# Patient Record
Sex: FEMALE | Race: WHITE
[De-identification: names, ages, dates, MRNs, and addresses within clinical notes are randomized per-mention and may not be internally consistent; named-entity substitution may affect disease eponyms.]

---

## 2019-12-11 ENCOUNTER — HOSPITAL ENCOUNTER (OUTPATIENT)
Dept: HOSPITAL 61 - ECHO | Age: 59
Discharge: HOME | End: 2019-12-11
Attending: INTERNAL MEDICINE
Payer: MEDICAID

## 2019-12-11 DIAGNOSIS — I08.8: Primary | ICD-10-CM

## 2019-12-11 PROCEDURE — 93306 TTE W/DOPPLER COMPLETE: CPT

## 2019-12-11 NOTE — CARD
MR#: [id number]

Account#: [id number]

Accession#: [id number]

Date of Study: 12/11/2019

Ordering Physician: AKIN CASTELLANO, 

Referring Physician: AKIN CASTELLANO 

Tech: Indigo Begum RDCS





--------------- APPROVED REPORT --------------





EXAM: Two-dimensional and M-mode echocardiogram with Doppler and color Doppler.



Other Information 

Quality : Good



INDICATION

Aortic Valve Disease



2D DIMENSIONS 

RVDd2.2 (2.9-3.5cm)Left Atrium(2D)2.7 (1.6-4.0cm)

IVSd0.8 (0.7-1.1cm)Aortic Root(2D)2.7 (2.0-3.7cm)

LVDd4.2 (3.9-5.9cm)LVOT Diameter1.9 (1.8-2.4cm)

PWd0.7 (0.7-1.1cm)LVDs2.8 (2.5-4.0cm)

FS (%) 34.4 %SV51.4 ml

LVEF(%)60.0 (>50%)



Aortic Valve

AoV Peak Jasmeet.241.6cm/sAoV VTI43.7cm

AO Peak GR.23.4mmHgLVOT Peak Jasmeet.89.4cm/s

AO Mean GR.13mmHgAVA (VMAX)1.10cm2

SYLVESTER   (VTI)1.10cm2



Mitral Valve

MV E Tdpylhaj19.0cm/sMV DECEL [id number]

MV A Gykdrrbm93.9cm/sE/A  Ratio0.7



Tricuspid Valve

TR P. Noutntew235pr/sRAP STNMROSO3ogWa

TR Peak Gr.53cgMtGZSO63jwQx



Pulmonary Vein

S1 Fnzbhvkf56.0cm/sD2 Riasipmn57.2cm/s



 LEFT VENTRICLE 

The left ventricle is normal size. There is normal left ventricular wall thickness. The left ventricu
lar systolic function is normal and the ejection fraction is within normal range. The Ejection Fracti
on is 55-60%. There is normal LV segmental wall motion. Transmitral Doppler flow pattern is Grade I-a
bnormal relaxation pattern.



 RIGHT VENTRICLE 

The right ventricle is normal size. The right ventricular systolic function is normal.



 ATRIA 

The left atrium size is normal. The right atrium size is normal. The interatrial septum is intact wit
h no evidence for an atrial septal defect or patent foramen ovale as noted on 2-D or Doppler imaging.




 AORTIC VALVE 

The aortic valve is functionally bicuspid with fusion of the right and left cusps. Doppler and Color 
Flow revealed trace aortic regurgitation. Calculated aortic valve area is 1.1 cm2 with maximum pressu
re gradient of 24 mmHg and mean pressure gradient of 13 mmHg. Doppler and color-flow analysis reveale
d mild aortic stenosis. Planimeter of the aortic valve shows an area of 1.7 cm2. 



 MITRAL VALVE 

The mitral valve is calcified but opens well. Mitral annular calcification is mild. There is no evide
nce of mitral valve prolapse. There is no mitral valve stenosis. Doppler and Color Flow revealed no m
itral valve regurgitation noted.



 TRICUSPID VALVE 

The tricuspid valve is normal in structure and function. Doppler and Color Flow revealed physiologica
l tricuspid regurgitation. The PA pressure was estimated at 19 mmHg. There is no tricuspid valve sten
osis.



 PULMONIC VALVE 

Doppler and Color Flow revealed trace to mild pulmonic valvular regurgitation. There is no pulmonic v
alvular stenosis.



 GREAT VESSELS 

The aortic root is normal in size. The ascending aorta is normal in size. The IVC is normal in size a
nd collapses >50% with inspiration.



 PERICARDIAL EFFUSION 

There is no evidence of significant pericardial effusion.



Critical Notification

Critical Value: No



<Conclusion>

The left ventricular systolic function is normal and the ejection fraction is within normal range. Th
e Ejection Fraction is 55-60%.

There is normal LV segmental wall motion.

The aortic valve is functionally bicuspid with fusion of the right and left cusps.

Calculated aortic valve area is 1.1 cm2 with maximum pressure gradient of 24 mmHg and mean pressure g
radient of 13 mmHg. Doppler and color-flow analysis revealed mild aortic stenosis. Planimeter of the 
aortic valve shows an area of 1.7 cm2.



Signed by : Chandler Iqbal, 

Electronically Approved : 12/11/2019 11:15:11

## 2020-12-16 ENCOUNTER — HOSPITAL ENCOUNTER (OUTPATIENT)
Dept: HOSPITAL 61 - ECHO | Age: 60
End: 2020-12-16
Attending: INTERNAL MEDICINE
Payer: MEDICAID

## 2020-12-16 DIAGNOSIS — I08.3: Primary | ICD-10-CM

## 2020-12-16 PROCEDURE — 93306 TTE W/DOPPLER COMPLETE: CPT

## 2020-12-18 NOTE — CARD
MR#: [id number]

Account#: [id number]

Accession#: [id number]

Date of Study: 12/16/2020

Ordering Physician: AKIN CASTELLANO, 

Referring Physician: AKIN CASTELLANO 

Tech: Rosario Garcia REBECCA





--------------- APPROVED REPORT --------------





EXAM: Two-dimensional and M-mode echocardiogram with Doppler and color Doppler.



Other Information 

Quality : AverageLimitedTechnically LimitedExcellentGoodAverageHR: 73bpm

Rhythm : NSR



INDICATION

Aortic Valve Disease

Murmur 



RISK FACTORS

Hyperlipidemia



2D DIMENSIONS 

RVDd3.2 (2.9-3.5cm)Left Atrium(2D)2.9 (1.6-4.0cm)

IVSd0.8 (0.7-1.1cm)Aortic Root(2D)2.8 (2.0-3.7cm)

LVDd3.6 (3.9-5.9cm)LVOT Diameter1.9 (1.8-2.4cm)

PWd0.8 (0.7-1.1cm)LVDs2.2 (2.5-4.0cm)

FS (%) 37.6 %SV37.4 ml



Aortic Valve

AoV Peak Jasmeet.229.3cm/sAoV VTI50.2cm

AO Peak GR.21.0mmHgLVOT Peak Jasmeet.83.5cm/s

AO Mean GR.11mmHgAVA (VMAX)0.99cm2



Mitral Valve

MV E Rsnerzeg54.4cm/sMV DECEL [id number]

MV A Ccyceyea79.7cm/sE/A  Ratio1.0

MV A Bikbdhgm184js



Pulmonary Valve

PV Peak Gesromsr96.5cm/s



Tricuspid Valve

TR P. Yrfhcpry602ne/sTR Peak Gr.20mmHg



Pulmonary Vein

S1 Spryaoof07.7cm/sD2 Xizdqpsg02.9cm/s

PVa ysolsnht812mdev



 LEFT VENTRICLE 

The left ventricle is normal size. There is normal left ventricular wall thickness. The left ventricu
lar systolic function is normal and the ejection fraction is within normal range. Ejection fraction 5
5-60%. There is normal LV segmental wall motion. Tissue Doppler imaging reveals mild abnormal left ve
ntricular diastolic dysfunction. No left ventricle thrombus noted on this study.



 RIGHT VENTRICLE 

The right ventricle is normal size. There is normal right ventricular wall thickness. The right ventr
icular systolic function is normal.



 ATRIA 

The left atrium size is normal. The right atrium size is normal. The interatrial septum is intact wit
h no evidence for an atrial septal defect or patent foramen ovale as noted on 2-D or Doppler imaging.




 AORTIC VALVE 

The aortic valve is mildly calcified. Doppler and Color Flow revealed no significant aortic regurgita
tion. There is mild valvular aortic stenosis.



 MITRAL VALVE 

The mitral valve is normal in structure and function. There is no evidence of mitral valve prolapse. 
There is no mitral valve stenosis. Doppler and Color-flow revealed mild mitral regurgitation.



 TRICUSPID VALVE 

The tricuspid valve is normal in structure and function. Doppler and Color Flow revealed mild tricusp
id regurgitation.



 PULMONIC VALVE 

The pulmonary valve is normal in structure and function. Doppler and Color Flow revealed no pulmonic 
valvular regurgitation.



 GREAT VESSELS 

The aortic root is normal in size. The descending aorta is upper limits of normal. The IVC is normal 
in size and collapses >50% with inspiration.



 PERICARDIAL EFFUSION 

There is no evidence of significant pericardial effusion.



Critical Notification

Critical Value: No



<Conclusion>

The left ventricle is normal size.

The left ventricular systolic function is normal and the ejection fraction is within normal range. 

Ejection fraction 55-60%.

There is normal LV segmental wall motion.

The aortic valve is mildly calcified.

Doppler and Color Flow revealed no significant aortic regurgitation.

There is mild valvular aortic stenosis.

Doppler and Color-flow revealed mild mitral regurgitation.

Doppler and Color Flow revealed mild tricuspid regurgitation.



Signed by : Skinny Ramirez MD

Electronically Approved : 12/18/2020 11:40:55

## 2020-12-31 ENCOUNTER — HOSPITAL ENCOUNTER (OUTPATIENT)
Dept: HOSPITAL 61 - LAB | Age: 60
End: 2020-12-31
Attending: INTERNAL MEDICINE
Payer: MEDICAID

## 2020-12-31 DIAGNOSIS — I63.9: ICD-10-CM

## 2020-12-31 DIAGNOSIS — Z01.812: Primary | ICD-10-CM

## 2020-12-31 DIAGNOSIS — Z20.828: ICD-10-CM

## 2020-12-31 DIAGNOSIS — I35.0: ICD-10-CM

## 2020-12-31 PROCEDURE — U0003 INFECTIOUS AGENT DETECTION BY NUCLEIC ACID (DNA OR RNA); SEVERE ACUTE RESPIRATORY SYNDROME CORONAVIRUS 2 (SARS-COV-2) (CORONAVIRUS DISEASE [COVID-19]), AMPLIFIED PROBE TECHNIQUE, MAKING USE OF HIGH THROUGHPUT TECHNOLOGIES AS DESCRIBED BY CMS-2020-01-R: HCPCS

## 2021-01-04 ENCOUNTER — HOSPITAL ENCOUNTER (OUTPATIENT)
Dept: HOSPITAL 61 - CCL | Age: 61
Discharge: HOME | End: 2021-01-04
Attending: INTERNAL MEDICINE
Payer: MEDICAID

## 2021-01-04 VITALS
DIASTOLIC BLOOD PRESSURE: 67 MMHG | SYSTOLIC BLOOD PRESSURE: 119 MMHG | DIASTOLIC BLOOD PRESSURE: 67 MMHG | SYSTOLIC BLOOD PRESSURE: 119 MMHG

## 2021-01-04 VITALS — DIASTOLIC BLOOD PRESSURE: 64 MMHG | SYSTOLIC BLOOD PRESSURE: 109 MMHG

## 2021-01-04 VITALS — DIASTOLIC BLOOD PRESSURE: 66 MMHG | SYSTOLIC BLOOD PRESSURE: 118 MMHG

## 2021-01-04 VITALS — BODY MASS INDEX: 20.2 KG/M2 | WEIGHT: 107 LBS | HEIGHT: 61 IN

## 2021-01-04 VITALS — DIASTOLIC BLOOD PRESSURE: 68 MMHG | SYSTOLIC BLOOD PRESSURE: 115 MMHG

## 2021-01-04 VITALS — DIASTOLIC BLOOD PRESSURE: 63 MMHG | SYSTOLIC BLOOD PRESSURE: 109 MMHG

## 2021-01-04 VITALS — DIASTOLIC BLOOD PRESSURE: 62 MMHG | SYSTOLIC BLOOD PRESSURE: 114 MMHG

## 2021-01-04 VITALS — SYSTOLIC BLOOD PRESSURE: 124 MMHG | DIASTOLIC BLOOD PRESSURE: 64 MMHG

## 2021-01-04 DIAGNOSIS — Z88.8: ICD-10-CM

## 2021-01-04 DIAGNOSIS — E78.00: ICD-10-CM

## 2021-01-04 DIAGNOSIS — Z45.09: Primary | ICD-10-CM

## 2021-01-04 DIAGNOSIS — E03.9: ICD-10-CM

## 2021-01-04 DIAGNOSIS — Z98.890: ICD-10-CM

## 2021-01-04 DIAGNOSIS — Z79.899: ICD-10-CM

## 2021-01-04 DIAGNOSIS — E11.9: ICD-10-CM

## 2021-01-04 DIAGNOSIS — I63.9: ICD-10-CM

## 2021-01-04 DIAGNOSIS — F32.9: ICD-10-CM

## 2021-01-04 LAB
ERYTHROCYTE [DISTWIDTH] IN BLOOD BY AUTOMATED COUNT: 13 % (ref 11.5–14.5)
HCT VFR BLD CALC: 38.8 % (ref 36–47)
HGB BLD-MCNC: 13.1 G/DL (ref 12–15.5)
MCH RBC QN AUTO: 31 PG (ref 25–35)
MCHC RBC AUTO-ENTMCNC: 34 G/DL (ref 31–37)
MCV RBC AUTO: 92 FL (ref 79–100)
PLATELET # BLD AUTO: 237 X10^3/UL (ref 140–400)
PROTHROMBIN TIME: 13.6 SEC (ref 11.7–14)
RBC # BLD AUTO: 4.2 X10^6/UL (ref 3.5–5.4)
WBC # BLD AUTO: 5.8 X10^3/UL (ref 4–11)

## 2021-01-04 PROCEDURE — 36415 COLL VENOUS BLD VENIPUNCTURE: CPT

## 2021-01-04 PROCEDURE — 85610 PROTHROMBIN TIME: CPT

## 2021-01-04 PROCEDURE — 33286 RMVL SUBQ CAR RHYTHM MNTR: CPT

## 2021-01-04 PROCEDURE — 85027 COMPLETE CBC AUTOMATED: CPT

## 2021-01-04 NOTE — NUR
Discharge Note:



[facility]



Discharge instructions and discharge home medications reviewed with Patient and a copy 
given. All questions have been answered and understanding verbalized. 



The following instructions and handouts were given: incision care



Discontinued lines and drains: Peripheral IV intact.



Patient discharged to Home or Self Care withFriendvia Ambulated.

Patient's left chest became red and irritated by the tape from the dressing so this was 
removed and aloe lotion applied to reddness. Bandaids placed over steri-strips.  Adhesive 
tape allergy added to patient's allergy list.

## 2021-01-04 NOTE — CARD
MR#: [id number]

Account#: [id number]

Accession#: [id number]

Date of Study: 01/04/2021

Ordering Physician: AKIN RODRIGES,

Referring Physician: AKIN RODRIGES,

Tech: 





--------------- APPROVED REPORT --------------





EXAM

Biotronik loop recorder explantation



INDICATIONS

CVA s/p loop recorder implantation presenting with battery depletion



Blood loss: < 5 ml



EXPLANTED DEVICES

And informed consent was obtained from patient.  She was brought to the cardiac Cath Lab and her left
 chest and shoulder were prepped and draped in the usual fashion.  10 cc of 2% lidocaine was infiltra
laura to the skin and subcutaneous tissues for local anesthesia.  An incision was made over the previou
s scar and using blunt dissection and cautery, the previously placed Biotronik loop recorder was thanh
ron.  The incision was closed in 1 layer.  Hemostasis was secured.  She tolerated the procedure well.
  There were no immediate complications.



CONCLUSION

Successful explantation of Biotronik loop recorder.



Signed by : Akin Rodriges, 

Electronically Approved : 01/04/2021 10:49:20

## 2021-12-22 ENCOUNTER — HOSPITAL ENCOUNTER (OUTPATIENT)
Dept: HOSPITAL 61 - ECHO | Age: 61
End: 2021-12-22
Attending: INTERNAL MEDICINE
Payer: MEDICAID

## 2021-12-22 DIAGNOSIS — I63.9: ICD-10-CM

## 2021-12-22 DIAGNOSIS — I35.1: Primary | ICD-10-CM

## 2021-12-22 PROCEDURE — 93306 TTE W/DOPPLER COMPLETE: CPT

## 2021-12-22 NOTE — CARD
MR#: [id number]

Account#: [id number]

Accession#: [id number]

Date of Study: 12/22/2021

Ordering Physician: AKIN CASTELLANO, 

Referring Physician: AKIN CASTELLANO, 

Tech: Shruthi Torre [facility]





--------------- APPROVED REPORT --------------





EXAM: Two-dimensional and M-mode echocardiogram with Doppler and color Doppler.



Other Information 

Quality : AverageHR: 63bpm



INDICATION

CVA/TIA 



RISK FACTORS

Hyperlipidemia

Asthma



2D DIMENSIONS 

RVDd2.3 (2.9-3.5cm)Left Atrium(2D)2.4 (1.6-4.0cm)

IVSd0.8 (0.7-1.1cm)Aortic Root(2D)2.8 (2.0-3.7cm)

LVDd4.1 (3.9-5.9cm)LVOT Diameter2.0 (1.8-2.4cm)

PWd0.8 (0.7-1.1cm)LVDs3.3 (2.5-4.0cm)

FS (%) 20.0 %SV31.1 ml



Aortic Valve

AoV Peak Jasmeet.224.5cm/sAoV VTI49.5cm

AO Peak GR.20.2mmHgLVOT Peak Jasmeet.86.0cm/s

LVOT  VTI 20.59cmAO Mean GR.12mmHg

SYLVESTER (VMAX)1.88sw8SNU   (VTI)1.34cm2



Mitral Valve

MV E Loonesfz15.5cm/sMV DECEL [id number]

MV A Yibpfvcf07.1cm/sMV E Mean Gr.2mmHg

MV JQX62isM/A  Ratio1.4

MVA (PHT)5.05cm2



TDI

E/Lateral E'7.6E/Medial E'11.6



Pulmonary Valve

PV Peak Wvgvqmxo91.4cm/sPV Peak Grad.3mmHg



Tricuspid Valve

TR P. Jdekyuad066su/sRAP VJUTMMAL2mdAz

TR Peak Gr.12yyQwZRFK57ruTf



Pulmonary Vein

S1 Liyzrgsq41.9cm/sD2 Wcvgspge61.4cm/s

PVa bdiofxwj69yjmp



 LEFT VENTRICLE 

The left ventricle is normal size. There is normal left ventricular wall thickness. The left ventricu
lar systolic function is normal and the ejection fraction is within normal range. The Ejection Fracti
on is 55-60%. There is normal LV segmental wall motion. Transmitral Doppler flow pattern is Grade II-
pseudonormal filling dynamics.



 RIGHT VENTRICLE 

The right ventricle is normal size. There is normal right ventricular wall thickness. The right ventr
icular systolic function is normal.



 ATRIA 

The left atrium size is normal. The right atrium size is normal. The interatrial septum is intact wit
h no evidence for an atrial septal defect or patent foramen ovale as noted on 2-D or Doppler imaging.




 AORTIC VALVE 

The aortic valve is calcified and displays decreased opening. Doppler and Color Flow revealed trace a
ortic regurgitation. Calculated aortic valve area is 1.31 cm2 with maximum pressure gradient of 24 mm
Hg and mean pressure gradient of 13 mmHg. There is mild valvular aortic stenosis.



 MITRAL VALVE 

The mitral valve is normal in structure and function. There is no evidence of mitral valve prolapse. 
There is no mitral valve stenosis. Doppler and Color-flow revealed trace mitral regurgitation.



 TRICUSPID VALVE 

The tricuspid valve is normal in structure and function. Doppler and Color Flow revealed trace tricus
pid regurgitation with an estimated PAP of 28 mmHg. There is no tricuspid valve stenosis.



 PULMONIC VALVE 

The pulmonic valve is not well visualized. Doppler and Color Flow revealed no pulmonic valvular regur
gitation.



 GREAT VESSELS 

The aortic root is normal in size. The IVC is normal in size and collapses >50% with inspiration.



 PERICARDIAL EFFUSION 

There is no evidence of significant pericardial effusion.



Critical Notification

Critical Value: No



<Conclusion>

The left ventricle is normal size.

The left ventricular systolic function is normal and the ejection fraction is within normal range.

The Ejection Fraction is 55-60%.

There is normal LV segmental wall motion.

The interatrial septum is intact with no evidence for an atrial septal defect or patent foramen ovale
 as noted on 2-D or Doppler imaging.

Doppler and Color Flow revealed trace aortic regurgitation.

Calculated aortic valve area is 1.31 cm2 with maximum pressure gradient of 24 mmHg and mean pressure 
gradient of 13 mmHg.

There is mild valvular aortic stenosis.

Doppler and Color-flow revealed trace mitral regurgitation.

Doppler and Color Flow revealed trace tricuspid regurgitation with an estimated PAP of 28 mmHg.



Signed by : Skinny Ramirez MD

Electronically Approved : 12/22/2021 18:01:25